# Patient Record
Sex: FEMALE | Race: OTHER | NOT HISPANIC OR LATINO
[De-identification: names, ages, dates, MRNs, and addresses within clinical notes are randomized per-mention and may not be internally consistent; named-entity substitution may affect disease eponyms.]

---
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
## 2024-01-02 ENCOUNTER — APPOINTMENT (OUTPATIENT)
Dept: PEDIATRIC ENDOCRINOLOGY | Facility: CLINIC | Age: 19
End: 2024-01-02

## 2024-01-02 RX ORDER — URINE ACETONE TEST STRIPS
STRIP MISCELLANEOUS
Qty: 300 | Refills: 3 | Status: ACTIVE | COMMUNITY
Start: 2019-11-25 | End: 1900-01-01

## 2024-01-02 RX ORDER — PEN NEEDLE, DIABETIC 29 G X1/2"
32G X 4 MM NEEDLE, DISPOSABLE MISCELLANEOUS
Qty: 200 | Refills: 6 | Status: ACTIVE | COMMUNITY
Start: 2019-07-01 | End: 1900-01-01

## 2024-01-02 RX ORDER — ISOPROPYL ALCOHOL 0.7 ML/ML
SWAB TOPICAL
Qty: 200 | Refills: 6 | Status: ACTIVE | COMMUNITY
Start: 2019-11-25 | End: 1900-01-01

## 2024-01-02 RX ORDER — BLOOD SUGAR DIAGNOSTIC
STRIP MISCELLANEOUS
Qty: 200 | Refills: 6 | Status: ACTIVE | COMMUNITY
Start: 2019-05-14 | End: 1900-01-01

## 2024-01-02 RX ORDER — GLUCAGON 3 MG/1
3 POWDER NASAL
Qty: 2 | Refills: 1 | Status: ACTIVE | COMMUNITY
Start: 2023-03-10 | End: 1900-01-01

## 2024-01-04 ENCOUNTER — APPOINTMENT (OUTPATIENT)
Dept: PEDIATRIC ENDOCRINOLOGY | Facility: CLINIC | Age: 19
End: 2024-01-04
Payer: COMMERCIAL

## 2024-01-04 VITALS
HEIGHT: 65.79 IN | WEIGHT: 228.5 LBS | HEART RATE: 97 BPM | DIASTOLIC BLOOD PRESSURE: 78 MMHG | BODY MASS INDEX: 37.17 KG/M2 | SYSTOLIC BLOOD PRESSURE: 121 MMHG

## 2024-01-04 LAB
BILIRUB UR QL STRIP: NEGATIVE
GLUCOSE BLDC GLUCOMTR-MCNC: 189
GLUCOSE UR-MCNC: NEGATIVE
HBA1C MFR BLD HPLC: 8.8
HCG UR QL: 0.2 EU/DL
HGB UR QL STRIP.AUTO: NEGATIVE
KETONES UR-MCNC: NEGATIVE
LEUKOCYTE ESTERASE UR QL STRIP: NORMAL
NITRITE UR QL STRIP: NEGATIVE
PH UR STRIP: 6
PROT UR STRIP-MCNC: NEGATIVE
SP GR UR STRIP: >=1.03

## 2024-01-04 PROCEDURE — 99215 OFFICE O/P EST HI 40 MIN: CPT | Mod: 25

## 2024-01-04 PROCEDURE — 82962 GLUCOSE BLOOD TEST: CPT

## 2024-01-04 PROCEDURE — 81003 URINALYSIS AUTO W/O SCOPE: CPT | Mod: QW

## 2024-01-04 PROCEDURE — 83036 HEMOGLOBIN GLYCOSYLATED A1C: CPT | Mod: QW

## 2024-01-04 PROCEDURE — 95251 CONT GLUC MNTR ANALYSIS I&R: CPT

## 2024-01-05 NOTE — ASSESSMENT
[FreeTextEntry1] : Maranda is a 19 y/o patient with T1DM, Hashimoto's thyroiditis, obesity, and insulin resistance as well as ovarian hyperandrogenism on MDI and CGM Dexcom G6 sensor who comes for follow up  Today's HgA1C is 8.8% which is up from 7.9% at last visit   Maranda is not consistently counting carbs and doing calculations for insulin dosing especially while at school.    Dexcom download shows BG drops after some meals and sometimes lows at night (usually when gives a rapid acting bolus at night OR when goes to bed with low BG). Other times, BGs are high for prolonged periods of time throughout the day.  At last visit, given my concern for nighttime lows, I have loosened her ISF from 9 PM to 6 AM to allow for a smaller bolus given at night in hope to prevent/reduce nighttime lows.  However, Maranda is not using the loser ratio at night as advised.    Other issues include : Hashimoto's thyroiditis - clinically euthyroid, biochemically euthyroid based on BW in 03/2023; not on meds Depression/PTSD/Anxiety - started seeing a counselor in school;  not seeing a psychiatrist; not on meds  Gender Dysphoria - not expressing interest in any hormone affirmation therapy at this time Acne/hirsutism on face - work up consistent with ovarian hyperandrogenism - not interested in OCPs, reports having regualr periods  History of Vitamin D insufficiency - not taking Vitamin D as advised   T1DM -Stressed importance of counting ALL carbs and doing calculations at all times .  Again advised to loosen ISF at night from 9 PM to 6 AM to 20 instead of 13 in hope to decrease lows after boluses.  Also stressed to use I:C of 1:10 instead of 1:6 at night to again try to prevent the lows.  If experiencing persistent highs after making this change, to let me know.   -Again offered to make an insulin sliding scale for Maranda which although will not offer total accuracy for coverage, correction, will provide her a consistent method for covering/correcting her carbs/sugars. Maranda continues to refuse and wants to do calculations.   -Discussed my concern about hypoglycemia especially at night  -Discussed in detail the health risk of hypoglycemia- altered mental status, seizures, death -Advised to make sure covering all meals and snacks -Maranda has not started pump therapy and not sure when want to start--> believes might want to start over the summer 2024 and thus can re-address at the 04/2024 appointment and send supplies at that time.   -Needs to see ophthalmology and dentist  Autoimmune thyroiditis -Repeat TFTs with next set of BW in the next few weeks  -Reviewed signs and symptoms of hypothyroidism  Obesity: -Weight stable from last visit -Encouraged decreasing junk food; encouraged to start regular physical activity -Discussed comorbidities associated with obesity fatty liver, HTN, SCFE, CHRIS, PCOS  Acne-on face and back -Following with Derm- somewhat improved with clindamycin -Hyperandrogenism work up showed elevated total testo and mild elevation of DHEAS. -Discussed likely ovarian hyperandrogenism (periods come most months but occasionally skips a month; does not lead a calendar ---> in the past we have discussed use of OCPs --> Maranda not interested  Vitamin D insufficiency Not taking Vitamin D as advised Will repeat levels with next set of labs  Depression/Gender Dysphoria/Anxiety/PTSD -Stressed importance of seeing mental health professional  RTC in 3 months BW in the next 1-2 weeks - Fasting  As patient is going to be turning 18 y/o this month, started discussing transitioning to adult provider after turns 20 in year.  Provided the list of adult endo providers in [place] to patient   I have independently seen this patient , reviewed data with RN, LYLAE as well as the patient/family and examined patient. I have spent 55 minutes of time on this visit as indicated below.

## 2024-01-05 NOTE — PHYSICAL EXAM
[Healthy Appearing] : healthy appearing [Interactive] : interactive [Obese] : obese [Normal Appearance] : normal appearance [Well formed] : well formed [Normally Set] : normally set [Normal S1 and S2] : normal S1 and S2 [Clear to Ausculation Bilaterally] : clear to auscultation bilaterally [Abdomen Soft] : soft [Abdomen Tenderness] : non-tender [] : no hepatosplenomegaly [Normal] : normal  [Goiter] : no goiter [Murmur] : no murmurs [de-identified] : AN on neck; +acne on face and back ; multiple papules and hyperpigmented macules on back ; a few strands of dark coarse hair on chin, no lipohypertrophy [de-identified] : wearing glasses  [de-identified] : no LAD  [de-identified] : Deferred: Last exam:  03/2021: PH 5, Breasts 5 ; wearinig shapewear  [de-identified] : multiple papules and hyperpigmented macules on back

## 2024-01-05 NOTE — HISTORY OF PRESENT ILLNESS
[Other: ___] :  blood sugar levels are tested [unfilled] times per day [Arms] : arms [Legs] : legs [_____ times per week] : mild symptoms occuring [unfilled] time(s) per week [Glucagon at Home] : has glucagon at home [Previous Hypoglycemic Seizure] : has no history of hypoglycemic seizure [FreeTextEntry2] : Maranda is a 18yr (prefers to be referred to using pronouns they/them, prefers to user her name Maranda) with T1DM (diagnosed in 6 y/o, +CORY 65 Abs, low C-peptide), euthyroid autoimmune thyroiditis, obesity, and insulin resistance as well as ovarian hyperandrogenism who comes for follow up.    Maranda is on Dexcomp G6 sensor and MDI.  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  Now, doing calculations but while at school not really counting carbs as very busy in school.  Since coming back home a few weeks ago, did start to count carbs and do calculations.  However, not using the correct nighttime I:C ratio of 1:10 (instead using daytime I:C 1:6) and the correct nighttime ISF (using daytime 1:13 instead of 1:20 that she is supposed to be using at night).   In the past as well as today offered patient sliding scale but patient declined in the past and continue to decline today  --Has not started on pump therapy -  still hesitant but believes might consider starting in the Summer 2024 when home.  Will readdress at next f/u visit in April and send all supplies (supplies previously sent but never picked up)  -Overall, very difficult to find patterns but definitely note a pattern of nighttime lows typically after a high BG (likely rapid acting bolus leading to a low).  Reports that wakes up feeling low and treats with juice.  BGs often persistently high throughout the day (not bolusing for the amount of carbs eaten? undercounting carbs? )   -PHYSICIAN REVIEW OF DEXCOM   Average , SD 83,86% sensor use; 35% in range, 2% low , <1% very low, 30 % high, 32 % very high  It appears that patient is giving random amounts of insulin without calculating carbs or doing calculation for her BG resulting in BGs dropping at times and at times not coming down   - BLOOD SUGAR TESTING PATTERN: using Dexcom G6 CGM with intermittent finger sticks;  - TIMES OF HYPERGLYCEMIA- various times throughout the day; bad headache, eyes feel weird as per patient immediately gives herself a correction-dose can be up to 20 units;  - TIMES OF HYPOGLYCEMIA: overnight; after giving a bolus; feels tired body is shaky and feels weak; Drinks juice or food. - PARENTAL PART IN CARE: patient does her care independently;  - INSULIN GIVEN BY: patient; - MISSED SHOTS:  denies; patient reports that she is using an I:C of 6 now for counting carbs- not using I:C of 10 overnight as instructed.   - RECENT HOSPITALIZATIONS: none; - RECENT ILLNESS:  none - ACTIVITY LEVEL: minimal; occasional walks; takes bus to city; patient reports she is going to move out of her from her home and move in with college mates to a home in [place] sometime in the late spring or early summer.  - MENSTRUAL HISTORY: LMP 12/08/2023 ; reports regular  - DIABETES EDUCATION TOPICS COVERED: importance of covering all carbs eaten, reviewed sick day guidelines, instructed on how to enter events into dexcom maria alejandra for more accurate logging, correct response to hypoglycemia  OTHER: - Annual Bloodwork 06/07/2022; 03/2023 -Optho-10/5/2021---> has not seen - DUE  -Dentist-last: 2022 --> DUE  -Covid vaccine Pfizer; second dose 6/12/2021; plus Booster -PCP:  07/27/2022 Flu vaccine given 10/2/2023 [facility] Skills-Education and Assessment Schedule-needs teaching in several areas as per self-assessment-today we discussed what is glucagon and how and when to administer; reviewed transition into adult endo, scheduling own appointments.   Current Insulin Regimen: Tresiba-70 units @ bedtime  I:C=1:6 (except 12 AM-6 AM: 1:10),  ISF=13 except 9 PM to 6 AM should be using 20; Target=120 However, patient is using I:C 1:6 and ISF 1:13 from 12 AM to 12 AM   Other issues:  Depression/Anxiety/PTSD -started seeing a school counselor a few weeks ago and journaling - finds helpful.   Not seeing a psychiatrist due to insurance restrictions.   -Not discussed today but at prior visits patient stated that feels like she is not of female or male gender and feels non-binary; bothered by her breasts and talks about wanting getting top surgery; states not bothered by her periods at all; Wants to be referred to using pronouns "they/them" , using her first name Maranda  - Has acne and elevated testosterone- work up consistent with ovarian hyperandrogenism  - discussed treatment with OCPs +/- spironolactone but patient declined as believes it would make her "more feminine"   -Obesity: From last visit lost 3 lbs. Overall - not doing any physical activity except walking to classes "as doesn't have time".  States eating a lot in the [place].    -Hashimoto's thyroiditis - not requiring meds - monitoring TFTs; no signs or symptoms of hypothyroidism such as fatigue, constipation, excessive hair loss, dry skin  -Vitamin D insufficiency -not taking Vitamin D 2000 IU daily as instructed at last visdit   [FreeTextEntry1] : Menarche 13 y/o ; LMP 12/08/2023

## 2024-01-05 NOTE — SCHOOL
[Type 1 Diabetes] : Type 1 Diabetes [___ PROVIDER INITIALS] : : ___[unfilled] [_____] : _x _ Insulin name: [unfilled] [] : _x [Dr. Evie Shen] : Dr. Evie Shen - License [id number] [[place] Office] : [address], [place] [address] [[place] Phone #] : Tel. [phone]    Fax. [phone] [Today's Date] : [unfilled] [FreeTextEntry6] : 06/21/2021 [FreeTextEntry7] : 8.0%

## 2024-01-05 NOTE — THERAPY
[Today's Date] : [unfilled] [___] : [unfilled] units of insulin pre-bedtime [Carbohydrate Ratio:                  1 unit for every ___ grams of carbohydrates] : Carbohydrate Ratio: 1 unit for every [unfilled] grams of carbohydrates [BG Target = ____] : BG Target = [unfilled] [Insulin Sensitivity Factor = ____] : Insulin Sensitivity Factor = [unfilled] [FreeTextEntry5] : Tresiba [FreeTextEntry3] : except 9 PM-6 AM: 1:10

## 2024-01-05 NOTE — CONSULT LETTER
[Dear  ___] : Dear  [unfilled], [Courtesy Letter:] : I had the pleasure of seeing your patient, [unfilled], in my office today. [Please see my note below.] : Please see my note below. [Consult Closing:] : Thank you very much for allowing me to participate in the care of this patient.  If you have any questions, please do not hesitate to contact me. [Sincerely,] : Sincerely, [FreeTextEntry3] : Evie Shen MD\par  Pediatric Endocrinology\par  [facility]\par

## 2024-01-05 NOTE — DATA REVIEWED
[FreeTextEntry1] : Review of Recent Laboratory Evaluation 08/28/2020 CMP: , no transaminitis Lipid Profiel: , , HDL 51,  (high)  TSH 2.59 (0.50-4.30), fT4 1.1 (0.9-1.8) Total IgA 126 () , negative anti-tTG IgA and IgG   Urine Microalbumin to creatinine ration < 30   03/23/201 BW done by PMD on 03/23/2021-[facility] at 19:19- nonfasting   Lipid Panel: -hihg, HDL 52, -high, -high  CMP:  , Na 134, , no trasnaminitis  HgA1C 11.1%  TSH 2.21 (0.50-4.30)  CBCd: normal/Iron studies normal/Hemoglobinopathy evaluation-normal   08/23/2021 Urine Microalbumin:Creatinine ratio < 30  CBCd: normal  LIpid Profile< , TG 63, HDL 42, ,  CMP:  , no transaminitis  HgA1C 7.5% Vitamin D 25 OH 18 -low  Normal TFTs: 1, TSH 3.74 , + anti-TPO antibodies, negative thyroglobulin Abs  Negative celiac screen (negative anti-tTG IgA ) normal total IgA of 123 Total Testo 57-high , 17 OHP- 24 - normal , Androstenedione 137 (Kam 5: ) , DHEAS 292 (Kam 5: , Adult )   01/24/2022  TSH 5.79 (0.50-4.30)-elevated , fT4 1.0 (0.9-1.8)  Vitamin D 25 OH -28  06/08/2022 Urine albumin:cr ratio : <30  CBCd: unremarkable  HgA1C 7.6%  Lipid Panel: , HDL 46, , TG 52  CMP: BG 88, no transaminitis  Normal TFTS: 1.95, fT4 0.9 (0.9-1.8) , anti-TPO - positive, thyroglobulin Abs-negative  negative anit-tTG IgA, total IgA 131 ()   03/11/2023 Urine Albumin/Creatinine < 30  CBCd unremarkable , -high, HDL 45, TG 90  CMP : , no transaminitis , normal renal function  fT4 0.9, , TSH 3.62 , +anti-TPO antibody and negative thyroglobulin antibody   HgA1C 8.1%  Vitamin D 25 OH - 21 ( -low  Total IgA 123 , anti-tTG IgA < 1.2 (<3.9)

## 2024-01-05 NOTE — REVIEW OF SYSTEMS
[Nl] : Neurological [Emotional Problems] : ~T emotional problems [Cold Intolerance] : no intolerance to cold [Heat Intolerance] : no intolerance to heat [FreeTextEntry3] : acne -follows with derm

## 2024-02-26 LAB
25(OH)D3 SERPL-MCNC: 22 NG/ML
ALBUMIN SERPL ELPH-MCNC: 4.4 G/DL
ALP BLD-CCNC: 74 U/L
ALT SERPL-CCNC: 8 U/L
ANION GAP SERPL CALC-SCNC: 13 MMOL/L
AST SERPL-CCNC: 10 U/L
BASOPHILS # BLD AUTO: 0.03 K/UL
BASOPHILS NFR BLD AUTO: 0.3 %
BILIRUB SERPL-MCNC: 0.5 MG/DL
BUN SERPL-MCNC: 8 MG/DL
CALCIUM SERPL-MCNC: 9.6 MG/DL
CHLORIDE SERPL-SCNC: 99 MMOL/L
CHOLEST SERPL-MCNC: 217 MG/DL
CO2 SERPL-SCNC: 24 MMOL/L
CREAT SERPL-MCNC: 0.6 MG/DL
CREAT SPEC-SCNC: 184 MG/DL
EGFR: 132 ML/MIN/1.73M2
EOSINOPHIL # BLD AUTO: 0.09 K/UL
EOSINOPHIL NFR BLD AUTO: 1 %
ESTIMATED AVERAGE GLUCOSE: 212 MG/DL
GLUCOSE SERPL-MCNC: 317 MG/DL
HBA1C MFR BLD HPLC: 9 %
HCT VFR BLD CALC: 41.6 %
HDLC SERPL-MCNC: 44 MG/DL
HGB BLD-MCNC: 12.9 G/DL
IGA SER QL IEP: 147 MG/DL
IMM GRANULOCYTES NFR BLD AUTO: 0.1 %
LDLC SERPL CALC-MCNC: 157 MG/DL
LYMPHOCYTES # BLD AUTO: 3.36 K/UL
LYMPHOCYTES NFR BLD AUTO: 37.6 %
MAN DIFF?: NORMAL
MCHC RBC-ENTMCNC: 26.1 PG
MCHC RBC-ENTMCNC: 31 G/DL
MCV RBC AUTO: 84.2 FL
MICROALBUMIN 24H UR DL<=1MG/L-MCNC: <1.2 MG/DL
MICROALBUMIN/CREAT 24H UR-RTO: NORMAL MG/G
MONOCYTES # BLD AUTO: 0.82 K/UL
MONOCYTES NFR BLD AUTO: 9.2 %
NEUTROPHILS # BLD AUTO: 4.63 K/UL
NEUTROPHILS NFR BLD AUTO: 51.8 %
NONHDLC SERPL-MCNC: 173 MG/DL
PLATELET # BLD AUTO: 388 K/UL
POTASSIUM SERPL-SCNC: 4.3 MMOL/L
PROT SERPL-MCNC: 7.9 G/DL
RBC # BLD: 4.94 M/UL
RBC # FLD: 13.6 %
SODIUM SERPL-SCNC: 136 MMOL/L
T4 FREE SERPL-MCNC: 1.1 NG/DL
TRIGL SERPL-MCNC: 78 MG/DL
TSH SERPL-ACNC: 4.87 UIU/ML
TTG IGA SER IA-ACNC: <1.2 U/ML
TTG IGA SER-ACNC: NEGATIVE
WBC # FLD AUTO: 8.94 K/UL

## 2024-04-01 ENCOUNTER — APPOINTMENT (OUTPATIENT)
Dept: PEDIATRIC ENDOCRINOLOGY | Facility: CLINIC | Age: 19
End: 2024-04-01

## 2024-05-07 ENCOUNTER — APPOINTMENT (OUTPATIENT)
Dept: PEDIATRIC ENDOCRINOLOGY | Facility: CLINIC | Age: 19
End: 2024-05-07
Payer: COMMERCIAL

## 2024-05-07 VITALS
HEART RATE: 91 BPM | SYSTOLIC BLOOD PRESSURE: 126 MMHG | TEMPERATURE: 97.9 F | HEIGHT: 66.18 IN | WEIGHT: 231.44 LBS | BODY MASS INDEX: 37.2 KG/M2 | RESPIRATION RATE: 22 BRPM | DIASTOLIC BLOOD PRESSURE: 62 MMHG

## 2024-05-07 DIAGNOSIS — L83 ACANTHOSIS NIGRICANS: ICD-10-CM

## 2024-05-07 DIAGNOSIS — E78.00 PURE HYPERCHOLESTEROLEMIA, UNSPECIFIED: ICD-10-CM

## 2024-05-07 DIAGNOSIS — E10.9 TYPE 1 DIABETES MELLITUS W/OUT COMPLICATIONS: ICD-10-CM

## 2024-05-07 DIAGNOSIS — E55.9 VITAMIN D DEFICIENCY, UNSPECIFIED: ICD-10-CM

## 2024-05-07 DIAGNOSIS — E06.3 AUTOIMMUNE THYROIDITIS: ICD-10-CM

## 2024-05-07 DIAGNOSIS — E66.9 OBESITY, UNSPECIFIED: ICD-10-CM

## 2024-05-07 LAB
BILIRUB UR QL STRIP: NEGATIVE
CLARITY UR: CLEAR
GLUCOSE BLDC GLUCOMTR-MCNC: 303
GLUCOSE UR-MCNC: 1000
HBA1C MFR BLD HPLC: 11.4
HCG UR QL: 0.2 EU/DL
HGB UR QL STRIP.AUTO: NEGATIVE
KETONES UR-MCNC: NEGATIVE
LEUKOCYTE ESTERASE UR QL STRIP: NEGATIVE
NITRITE UR QL STRIP: NEGATIVE
PH UR STRIP: 6.5
PROT UR STRIP-MCNC: NEGATIVE
SP GR UR STRIP: 1.01

## 2024-05-07 PROCEDURE — 81003 URINALYSIS AUTO W/O SCOPE: CPT | Mod: QW

## 2024-05-07 PROCEDURE — 83036 HEMOGLOBIN GLYCOSYLATED A1C: CPT | Mod: QW

## 2024-05-07 PROCEDURE — 95251 CONT GLUC MNTR ANALYSIS I&R: CPT

## 2024-05-07 PROCEDURE — 99215 OFFICE O/P EST HI 40 MIN: CPT | Mod: 25

## 2024-05-07 PROCEDURE — 82962 GLUCOSE BLOOD TEST: CPT

## 2024-05-07 RX ORDER — INSULIN LISPRO 100 [IU]/ML
100 INJECTION, SOLUTION INTRAVENOUS; SUBCUTANEOUS
Qty: 2 | Refills: 6 | Status: ACTIVE | COMMUNITY
Start: 2022-02-03 | End: 1900-01-01

## 2024-05-07 RX ORDER — INSULIN PMP CART,AUT,G6/7,CNTR
EACH SUBCUTANEOUS
Qty: 15 | Refills: 11 | Status: ACTIVE | COMMUNITY
Start: 2022-07-25 | End: 1900-01-01

## 2024-05-07 RX ORDER — INSULIN DEGLUDEC INJECTION 100 U/ML
100 INJECTION, SOLUTION SUBCUTANEOUS
Qty: 2 | Refills: 6 | Status: ACTIVE | COMMUNITY
Start: 2019-11-25 | End: 1900-01-01

## 2024-05-07 RX ORDER — INSULIN LISPRO 100 [IU]/ML
100 INJECTION, SOLUTION INTRAVENOUS; SUBCUTANEOUS
Qty: 5 | Refills: 6 | Status: ACTIVE | COMMUNITY
Start: 2024-05-07 | End: 1900-01-01

## 2024-05-13 PROBLEM — L83 ACANTHOSIS NIGRICANS: Status: ACTIVE | Noted: 2019-06-11

## 2024-05-13 PROBLEM — E78.00 ELEVATED LDL CHOLESTEROL LEVEL: Status: ACTIVE | Noted: 2024-02-26

## 2024-05-13 PROBLEM — E55.9 VITAMIN D DEFICIENCY: Status: ACTIVE | Noted: 2019-06-11

## 2024-05-13 PROBLEM — E66.9 OBESITY, UNSPECIFIED: Status: ACTIVE | Noted: 2019-06-11

## 2024-05-13 PROBLEM — E06.3 AUTOIMMUNE THYROIDITIS: Status: ACTIVE | Noted: 2019-06-11

## 2024-05-13 NOTE — CONSULT LETTER
[Dear  ___] : Dear  [unfilled], [Courtesy Letter:] : I had the pleasure of seeing your patient, [unfilled], in my office today. [Please see my note below.] : Please see my note below. [Consult Closing:] : Thank you very much for allowing me to participate in the care of this patient.  If you have any questions, please do not hesitate to contact me. [Sincerely,] : Sincerely, [FreeTextEntry3] : Evie Shen MD\par  Pediatric Endocrinology\par  [facility]\par

## 2024-05-13 NOTE — DATA REVIEWED
[FreeTextEntry1] : Review of Recent Laboratory Evaluation 08/28/2020 CMP: , no transaminitis Lipid Profiel: , , HDL 51,  (high)  TSH 2.59 (0.50-4.30), fT4 1.1 (0.9-1.8) Total IgA 126 () , negative anti-tTG IgA and IgG   Urine Microalbumin to creatinine ration < 30   03/23/201 BW done by PMD on 03/23/2021-[facility] at 19:19- nonfasting   Lipid Panel: -hihg, HDL 52, -high, -high  CMP:  , Na 134, , no trasnaminitis  HgA1C 11.1%  TSH 2.21 (0.50-4.30)  CBCd: normal/Iron studies normal/Hemoglobinopathy evaluation-normal   08/23/2021 Urine Microalbumin:Creatinine ratio < 30  CBCd: normal  LIpid Profile< , TG 63, HDL 42, ,  CMP:  , no transaminitis  HgA1C 7.5% Vitamin D 25 OH 18 -low  Normal TFTs: 1, TSH 3.74 , + anti-TPO antibodies, negative thyroglobulin Abs  Negative celiac screen (negative anti-tTG IgA ) normal total IgA of 123 Total Testo 57-high , 17 OHP- 24 - normal , Androstenedione 137 (Kam 5: ) , DHEAS 292 (Kam 5: , Adult )   01/24/2022  TSH 5.79 (0.50-4.30)-elevated , fT4 1.0 (0.9-1.8)  Vitamin D 25 OH -28  06/08/2022 Urine albumin:cr ratio : <30  CBCd: unremarkable  HgA1C 7.6%  Lipid Panel: , HDL 46, , TG 52  CMP: BG 88, no transaminitis  Normal TFTS: 1.95, fT4 0.9 (0.9-1.8) , anti-TPO - positive, thyroglobulin Abs-negative  negative anit-tTG IgA, total IgA 131 ()   03/11/2023 Urine Albumin/Creatinine < 30  CBCd unremarkable , -high, HDL 45, TG 90  CMP : , no transaminitis , normal renal function  fT4 0.9, , TSH 3.62 , +anti-TPO antibody and negative thyroglobulin antibody   HgA1C 8.1%  Vitamin D 25 OH - 21 ( -low  Total IgA 123 , anti-tTG IgA < 1.2 (<3.9)   01/12/2024 HgA1C 9% Urine Albumin/Creatinine <1.2 (0-30)  CBCd: normal Hg  CMP:  -high, no trasaminitis  Lipid Panel: , TG 78, -high, HDL 44  anti-tTG <1.2,  total IgA 147 -normla TSH 4.87 (0.27-4.20)-high, fT4 1.1  Vitamin D 22 (30-80)-low

## 2024-05-13 NOTE — ASSESSMENT
[FreeTextEntry1] : Maranda is a 20 y/o patient with T1DM, Hashimoto's thyroiditis, obesity, and insulin resistance, hypercholesterolemia, Vitamin D insufficiency as well as ovarian hyperandrogenism on MDI and CGM Dexcom G6 sensor who comes for follow up  Today's HgA1C is 11.4% which is up from 8.8% at last visit   Maranda is mostly not counting carbs and doing calculations for insulin dosing.  She often does not even put on her dexcom.  Does report that gives Tresiba every night.  Reports this is secondary to depression and not being able to keep up with her diabetes.  After initialization of Lexapro a month ago, reports feeling better. Would like to get a tubeless insulin pump to help with diabetes management.   Has not repeated fasting BW as advised prior to this visit  Has not seen optho or dentist > 1 year   Other issues include : Hashimoto's thyroiditis - clinically euthyroid, biochemically slight elevatoin of TSH on BW in 01/2024---> repeat testing now done.  Depression/PTSD/Anxiety - started seeing a counselor in school; now on lexapro  Gender Dysphoria - not expressing interest in any hormone affirmation therapy at this time Acne/hirsutism on face - work up consistent with ovarian hyperandrogenism - not interested in OCPs, reports having regular periods  History of Vitamin D insufficiency - Taking Vitamin D 2000 IU  Hypercholesterolemia- LDL cholesterol 157 in 01/2024 ---> advised lifestyle changes and repeat leves - not done  T1DM -Stressed importance of counting ALL carbs and doing calculations at all times .  Again advised to loosen ISF at night from 9 PM to 6 AM to 20 instead of 13 in hope to decrease lows after boluses.  Also stressed to use I:C of 1:10 instead of 1:6 at night to again try to prevent the lows.  If experiencing persistent highs after making this change, to let me know.   -Advised to make sure covering all meals and snacks -Patient would like to start insulin pump - Omnipod 5---> discussed with her that I am in agreement with starting the pump. However, she does need to understand that even with pump, she still needs to be entering her carbs and if dexcom is not worn, pump will not be in automated mode.  Patient would like to proceed.  Orders in chart.    Autoimmune thyroiditis -Repeat TFTs with next set of BW next week  -Reviewed signs and symptoms of hypothyroidism  Obesity: -Encouraged decreasing junk food; encouraged to start regular physical activity -Discussed comorbidities associated with obesity fatty liver, HTN, SCFE, CHRIS, PCOS  Hypercholesterolemia -Advised fasting BW next week --> if LDL cholesterol > 130, will plan to start stain   Acne-on face and back -Following with Derm- somewhat improved with clindamycin -Hyperandrogenism work up showed elevated total testo and mild elevation of DHEAS. -Discussed likely ovarian hyperandrogenism (periods come most months but occasionally skips a month; does not lead a calendar ---> in the past we have discussed use of OCPs --> Maranda not interested  Vitamin D insufficiency Continue Vitamin D 2000 IU daily  Will repeat levels   Depression/Gender Dysphoria/Anxiety/PTSD -Now following with psych; on lexapro   RTC in 2 months BW in the next 1-2 weeks - Fasting  As patient is going to be turning 21 y/o next year, we started discussing transitioning to adult provider after turns 20at last visit.   List of adult providers has been provided to patient in the past --> will readdress at next visit.   I have independently seen this patient , reviewed data with RN, AMINA as well as the patient/family and examined patient. I have spent 60 minutes of time on this visit as indicated below.

## 2024-05-13 NOTE — HISTORY OF PRESENT ILLNESS
[Other: ___] :  blood sugar levels are tested [unfilled] times per day [Arms] : arms [Legs] : legs [Glucagon at Home] : has glucagon at home [Previous Hypoglycemic Seizure] : has no history of hypoglycemic seizure [FreeTextEntry2] : Maranda is a 19yr (prefers to be referred to using pronouns they/them, prefers to user her name Maranda) with T1DM (diagnosed in 6 y/o, +CORY 65 Abs, low C-peptide), euthyroid autoimmune thyroiditis, obesity, and insulin resistance as well as ovarian hyperandrogenism who comes for follow up.    Maranda is on Dexcomp G6 sensor and MDI.  Has been hesitant about using pump technology for a long time  Last Endo visit: 01/2024   Today HgA1C is 11.4 % (up from 8.8% at last visit)   Since last visit -States has been depressed - now seeing psychiatrist at [facility] - started lexapro 4 weeks ago - just incresed dose from 10 to 15 mg daily - believes helping somewhat -States due to her depression has not been bolusing for meals/blood sugars or counting carbs or even placing her dexcom on for days in a row; reports that  has been giving tresiba daily without missing doses  -Believes she is ready for pump therapy- wants a tubeless pump - again discussed omnipod 5 ---> patient would like to proceed with Omnipod 5 --> states is getting back from college next week and can then work on training  -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  -Has not seen optho or dentist for > 1 year    -PHYSICIAN REVIEW OF DEXCOM   Average ,  , 29% sensor use; 29% in range, 10% high, 59%, 1% low, < 1% very low   - BLOOD SUGAR TESTING PATTERN: using Dexcom G6 CGM with intermittent finger sticks; At today's visit patient's download of dexcom has several days of no data-patient reports that she is having difficulty with 'daily management' of diabetes lately. Patient reports that she has been suffering from severe depression and anxiety approximately four weeks ago. She reports it is helping some but still struggling with daily management. Has meter but reports she does not check her blood sugar.  - TIMES OF HYPERGLYCEMIA- various times throughout the day; bad headache, eyes feel weird as per patient immediately gives herself a correction-dose can be up to 20 units during the day and will 13 units between 9pm-12am. Denies symptoms  - TIMES OF HYPOGLYCEMIA: occasionally overnight; after giving a bolus; feels tired body is shaky and feels weak; Drinks juice or food. - PARENTAL PART IN CARE: patient does her care independently;  - INSULIN GIVEN BY: patient; - MISSED SHOTS:  denies missing long-acting; not counting carbs; not using 1:C.  - RECENT HOSPITALIZATIONS: none; - RECENT ILLNESS:  none - ACTIVITY LEVEL: minimal; - MENSTRUAL HISTORY: LMP 04/18/2024 ; reports regular  - DIABETES EDUCATION TOPICS COVERED: importance of covering all carbs eaten, reviewed sick day guidelines, correct response to hypoglycemia  OTHER: - Annual Bloodwork 06/07/2022; 03/2023; 01/12/2024 -Optho-10/5/2021---> has not seen - DUE  -Dentist-last: 2022 --> DUE  -Covid vaccine Pfizer; second dose 6/12/2021; plus Booster -PCP:  07/27/2022 Flu vaccine given 10/2/2023 [facility] Skills-Education and Assessment Schedule-needs teaching in several areas as per self-assessment-today we discussed what is glucagon and how and when to administer; reviewed transition into adult endo, scheduling own appointments.   Current Insulin Regimen: Tresiba-70 units @ bedtime  I:C=1:6 (except 12 AM-6 AM: 1:10),  ISF=13 except 9 PM to 6 AM should be using 20; Target=120 However, patient is using I:C 1:6 and ISF 1:13 from 12 AM to 12 AM -as per this visit patient reports not counting carbs, not doing calculations.    Other issues:  Depression/Anxiety/PTSD -started seeing a school counselor a few weeks ago and journaling - finds helpful.  Now seeing a psychiatrist through [facility]- on Lexapro 10 mg started ~ 4 weeks ago --> increasing dose to 15 mg today as per patient  -Not discussed today but at prior visits patient stated that feels like she is not of female or male gender and feels non-binary; bothered by her breasts and talks about wanting getting top surgery; states not bothered by her periods at all; Wants to be referred to using pronouns "they/them" , using her first name Maranda  - Has acne and elevated testosterone- work up consistent with ovarian hyperandrogenism  - discussed treatment with OCPs +/- spironolactone but patient declined as believes it would make her "more feminine"   -Obesity: Gained 3 lbs. Overall - not doing any physical activity except walking to classes "as doesn't have time".  States eating a lot in the [place].    -Hashimoto's thyroiditis - not requiring meds - monitoring TFTs; no signs or symptoms of hypothyroidism such as fatigue, constipation, excessive hair loss, dry skin.  Last set of BW in 01/2024 - slight elevation of TSH with normal fT4 --> advised to repeat TFTs  -Hypercholesterolemia - LDL cholesterol of 157 in 01/2024 --> advised dietary modifictions and repeat testing - not done  -Vitamin D insufficiency- reports taking Vitamin D 2000 IU daily as instructed at last visit   [FreeTextEntry1] : Menarche 11 y/o ; LMP 04/18/2024

## 2024-05-13 NOTE — PHYSICAL EXAM
[Healthy Appearing] : healthy appearing [Interactive] : interactive [Obese] : obese [Normal Appearance] : normal appearance [Well formed] : well formed [Normally Set] : normally set [Normal S1 and S2] : normal S1 and S2 [Clear to Ausculation Bilaterally] : clear to auscultation bilaterally [Abdomen Soft] : soft [] : no hepatosplenomegaly [Abdomen Tenderness] : non-tender [Normal] : normal  [Goiter] : no goiter [Murmur] : no murmurs [de-identified] : AN on neck; +acne on face and back ; multiple papules and hyperpigmented macules on back ; a few strands of dark coarse hair on chin, no lipohypertrophy [de-identified] : wearing glasses  [de-identified] : no LAD  [de-identified] : Deferred: Last exam:  03/2021: PH 5, Breasts 5 ; wearinig shapewear  [de-identified] : multiple papules and hyperpigmented macules on back

## 2024-05-13 NOTE — SCHOOL
[Type 1 Diabetes] : Type 1 Diabetes [___ PROVIDER INITIALS] : : ___[unfilled] [_____] : _x _ Insulin name: [unfilled] [] : _x [Dr. Evie Shen] : Dr. Evie Shen - License [id number] [[place] Office] : [address], [place] [address] [[place] Phone #] : Tel. [phone]    Fax. [phone] [Today's Date] : [unfilled] [FreeTextEntry6] : 06/21/2021 [FreeTextEntry7] : 8.0%

## 2024-05-13 NOTE — REVIEW OF SYSTEMS
[Nl] : Neurological [Emotional Problems] : ~T emotional problems [Cold Intolerance] : no intolerance to cold [Heat Intolerance] : no intolerance to heat [FreeTextEntry3] : acne -follows with derm  [FreeTextEntry2] : on lexapro for depression

## 2024-06-25 RX ORDER — INSULIN PMP CART,AUT,G6/7,CNTR
EACH SUBCUTANEOUS
Qty: 1 | Refills: 0 | Status: ACTIVE | COMMUNITY
Start: 2022-07-25 | End: 1900-01-01

## 2024-07-02 ENCOUNTER — NON-APPOINTMENT (OUTPATIENT)
Age: 19
End: 2024-07-02

## 2024-07-08 ENCOUNTER — APPOINTMENT (OUTPATIENT)
Dept: PEDIATRIC ENDOCRINOLOGY | Facility: CLINIC | Age: 19
End: 2024-07-08

## 2024-07-08 ENCOUNTER — NON-APPOINTMENT (OUTPATIENT)
Age: 19
End: 2024-07-08

## 2024-12-09 ENCOUNTER — APPOINTMENT (OUTPATIENT)
Dept: PEDIATRIC ENDOCRINOLOGY | Facility: CLINIC | Age: 19
End: 2024-12-09

## 2025-02-10 ENCOUNTER — APPOINTMENT (OUTPATIENT)
Dept: PEDIATRIC ENDOCRINOLOGY | Facility: CLINIC | Age: 20
End: 2025-02-10

## 2025-03-25 ENCOUNTER — APPOINTMENT (OUTPATIENT)
Dept: PEDIATRIC ENDOCRINOLOGY | Facility: CLINIC | Age: 20
End: 2025-03-25

## 2025-03-28 ENCOUNTER — APPOINTMENT (OUTPATIENT)
Dept: PEDIATRIC ENDOCRINOLOGY | Facility: CLINIC | Age: 20
End: 2025-03-28
Payer: COMMERCIAL

## 2025-03-28 VITALS
WEIGHT: 262.6 LBS | BODY MASS INDEX: 41.7 KG/M2 | HEART RATE: 100 BPM | DIASTOLIC BLOOD PRESSURE: 67 MMHG | HEIGHT: 66.34 IN | SYSTOLIC BLOOD PRESSURE: 102 MMHG

## 2025-03-28 DIAGNOSIS — E78.00 PURE HYPERCHOLESTEROLEMIA, UNSPECIFIED: ICD-10-CM

## 2025-03-28 DIAGNOSIS — E06.3 AUTOIMMUNE THYROIDITIS: ICD-10-CM

## 2025-03-28 DIAGNOSIS — F32.A DEPRESSION, UNSPECIFIED: ICD-10-CM

## 2025-03-28 DIAGNOSIS — E66.9 OBESITY, UNSPECIFIED: ICD-10-CM

## 2025-03-28 DIAGNOSIS — E10.9 TYPE 1 DIABETES MELLITUS W/OUT COMPLICATIONS: ICD-10-CM

## 2025-03-28 DIAGNOSIS — R76.8 OTHER SPECIFIED ABNORMAL IMMUNOLOGICAL FINDINGS IN SERUM: ICD-10-CM

## 2025-03-28 DIAGNOSIS — L83 ACANTHOSIS NIGRICANS: ICD-10-CM

## 2025-03-28 DIAGNOSIS — N92.6 IRREGULAR MENSTRUATION, UNSPECIFIED: ICD-10-CM

## 2025-03-28 DIAGNOSIS — E10.65 TYPE 1 DIABETES MELLITUS WITH HYPERGLYCEMIA: ICD-10-CM

## 2025-03-28 LAB
ALBUMIN SERPL ELPH-MCNC: 4.2 G/DL
ALP BLD-CCNC: 96 U/L
ALT SERPL-CCNC: 13 U/L
ANION GAP SERPL CALC-SCNC: 15 MMOL/L
AST SERPL-CCNC: 14 U/L
BASOPHILS # BLD AUTO: 0.04 K/UL
BASOPHILS NFR BLD AUTO: 0.4 %
BILIRUB SERPL-MCNC: 0.3 MG/DL
BUN SERPL-MCNC: 8 MG/DL
CALCIUM SERPL-MCNC: 9.4 MG/DL
CHLORIDE SERPL-SCNC: 99 MMOL/L
CHOLEST SERPL-MCNC: 208 MG/DL
CK SERPL-CCNC: 95 U/L
CO2 SERPL-SCNC: 24 MMOL/L
CREAT SERPL-MCNC: 0.6 MG/DL
EGFRCR SERPLBLD CKD-EPI 2021: 131 ML/MIN/1.73M2
EOSINOPHIL # BLD AUTO: 0.18 K/UL
EOSINOPHIL NFR BLD AUTO: 2 %
ESTIMATED AVERAGE GLUCOSE: 229 MG/DL
GLUCOSE BLDC GLUCOMTR-MCNC: 141
GLUCOSE SERPL-MCNC: 199 MG/DL
HBA1C MFR BLD HPLC: 10
HBA1C MFR BLD HPLC: 9.6 %
HCT VFR BLD CALC: 39.5 %
HDLC SERPL-MCNC: 43 MG/DL
HGB BLD-MCNC: 12.6 G/DL
IMM GRANULOCYTES NFR BLD AUTO: 0.4 %
LDLC SERPL-MCNC: 151 MG/DL
LYMPHOCYTES # BLD AUTO: 2.48 K/UL
LYMPHOCYTES NFR BLD AUTO: 27.8 %
MAN DIFF?: NORMAL
MCHC RBC-ENTMCNC: 26.5 PG
MCHC RBC-ENTMCNC: 31.9 G/DL
MCV RBC AUTO: 83 FL
MONOCYTES # BLD AUTO: 0.52 K/UL
MONOCYTES NFR BLD AUTO: 5.8 %
NEUTROPHILS # BLD AUTO: 5.65 K/UL
NEUTROPHILS NFR BLD AUTO: 63.6 %
NONHDLC SERPL-MCNC: 165 MG/DL
PLATELET # BLD AUTO: 348 K/UL
PMV BLD AUTO: 0 /100 WBCS
PMV BLD: 10.2 FL
POTASSIUM SERPL-SCNC: 4.6 MMOL/L
PROT SERPL-MCNC: 7.2 G/DL
RBC # BLD: 4.76 M/UL
RBC # FLD: 14.4 %
SODIUM SERPL-SCNC: 138 MMOL/L
T4 FREE SERPL-MCNC: 1 NG/DL
TRIGL SERPL-MCNC: 75 MG/DL
TSH SERPL-ACNC: 4.09 UIU/ML
WBC # FLD AUTO: 8.91 K/UL

## 2025-03-28 PROCEDURE — 83036 HEMOGLOBIN GLYCOSYLATED A1C: CPT | Mod: QW

## 2025-03-28 PROCEDURE — 99215 OFFICE O/P EST HI 40 MIN: CPT

## 2025-03-28 PROCEDURE — 82962 GLUCOSE BLOOD TEST: CPT

## 2025-03-28 PROCEDURE — 95251 CONT GLUC MNTR ANALYSIS I&R: CPT

## 2025-03-28 PROCEDURE — 99417 PROLNG OP E/M EACH 15 MIN: CPT

## 2025-03-29 LAB
DHEA-S SERPL-MCNC: 399 UG/DL
PROLACTIN SERPL-MCNC: 7.6 NG/ML
PROLACTIN SERPL-MCNC: 7.6 NG/ML
SHBG SERPL-SCNC: 19.4 NMOL/L
THYROGLOB AB SERPL-ACNC: 150 IU/ML
THYROPEROXIDASE AB SERPL IA-ACNC: 448 IU/ML
TTG IGA SER IA-ACNC: <0.5 U/ML
TTG IGA SER-ACNC: NEGATIVE

## 2025-03-31 LAB — IGA SER QL IEP: 140 MG/DL

## 2025-04-02 LAB — 17OHP SERPL-MCNC: 29 NG/DL

## 2025-04-04 LAB — ANDROST SERPL-MCNC: 117 NG/DL

## 2025-05-02 RX ORDER — ATORVASTATIN CALCIUM 10 MG/1
10 TABLET, FILM COATED ORAL
Qty: 30 | Refills: 3 | Status: ACTIVE | COMMUNITY
Start: 2025-05-02 | End: 1900-01-01